# Patient Record
Sex: FEMALE | Race: WHITE | NOT HISPANIC OR LATINO | Employment: UNEMPLOYED | URBAN - METROPOLITAN AREA
[De-identification: names, ages, dates, MRNs, and addresses within clinical notes are randomized per-mention and may not be internally consistent; named-entity substitution may affect disease eponyms.]

---

## 2021-09-09 ENCOUNTER — TELEPHONE (OUTPATIENT)
Dept: SCHEDULING | Facility: IMAGING CENTER | Age: 66
End: 2021-09-09

## 2021-09-10 ENCOUNTER — SUPERVISING PHYSICIAN REVIEW (OUTPATIENT)
Dept: MEDICAL GROUP | Facility: MEDICAL CENTER | Age: 66
End: 2021-09-10

## 2021-09-10 ENCOUNTER — OFFICE VISIT (OUTPATIENT)
Dept: MEDICAL GROUP | Facility: MEDICAL CENTER | Age: 66
End: 2021-09-10

## 2021-09-10 VITALS
HEART RATE: 75 BPM | WEIGHT: 145.94 LBS | OXYGEN SATURATION: 95 % | HEIGHT: 67 IN | DIASTOLIC BLOOD PRESSURE: 88 MMHG | SYSTOLIC BLOOD PRESSURE: 138 MMHG | BODY MASS INDEX: 22.91 KG/M2 | TEMPERATURE: 98.9 F

## 2021-09-10 DIAGNOSIS — Z13.1 SCREENING FOR DIABETES MELLITUS: ICD-10-CM

## 2021-09-10 DIAGNOSIS — E78.5 HYPERLIPIDEMIA, UNSPECIFIED HYPERLIPIDEMIA TYPE: ICD-10-CM

## 2021-09-10 DIAGNOSIS — I10 ESSENTIAL HYPERTENSION: ICD-10-CM

## 2021-09-10 DIAGNOSIS — Z12.31 ENCOUNTER FOR SCREENING MAMMOGRAM FOR MALIGNANT NEOPLASM OF BREAST: ICD-10-CM

## 2021-09-10 PROCEDURE — 99214 OFFICE O/P EST MOD 30 MIN: CPT | Performed by: FAMILY MEDICINE

## 2021-09-10 RX ORDER — CARVEDILOL 3.12 MG/1
3.12 TABLET ORAL 2 TIMES DAILY WITH MEALS
Qty: 180 TABLET | Refills: 3 | Status: SHIPPED | OUTPATIENT
Start: 2021-09-10

## 2021-09-10 RX ORDER — LOSARTAN POTASSIUM AND HYDROCHLOROTHIAZIDE 25; 100 MG/1; MG/1
1 TABLET ORAL DAILY
COMMUNITY
End: 2021-09-10 | Stop reason: SDUPTHER

## 2021-09-10 RX ORDER — CHLORTHALIDONE 25 MG/1
25 TABLET ORAL DAILY
COMMUNITY
End: 2021-09-10 | Stop reason: SDUPTHER

## 2021-09-10 RX ORDER — LOSARTAN POTASSIUM 100 MG/1
100 TABLET ORAL DAILY
Qty: 90 TABLET | Refills: 3 | Status: SHIPPED | OUTPATIENT
Start: 2021-09-10

## 2021-09-10 RX ORDER — CHLORTHALIDONE 25 MG/1
25 TABLET ORAL DAILY
Qty: 90 TABLET | Refills: 3 | Status: SHIPPED | OUTPATIENT
Start: 2021-09-10

## 2021-09-10 RX ORDER — LOSARTAN POTASSIUM AND HYDROCHLOROTHIAZIDE 25; 100 MG/1; MG/1
1 TABLET ORAL DAILY
Qty: 90 TABLET | Refills: 3 | Status: SHIPPED
Start: 2021-09-10 | End: 2021-09-10

## 2021-09-10 ASSESSMENT — ENCOUNTER SYMPTOMS
DIARRHEA: 0
MYALGIAS: 0
DEPRESSION: 0
DOUBLE VISION: 0
WEIGHT LOSS: 0
BRUISES/BLEEDS EASILY: 0
TINGLING: 0
PALPITATIONS: 0
BLURRED VISION: 0
NERVOUS/ANXIOUS: 0
ABDOMINAL PAIN: 0
DIZZINESS: 0
VOMITING: 0
CONSTIPATION: 0
WEAKNESS: 0
SORE THROAT: 0
SHORTNESS OF BREATH: 0
CHILLS: 0
NAUSEA: 0
COUGH: 0
FEVER: 0

## 2021-09-10 ASSESSMENT — PATIENT HEALTH QUESTIONNAIRE - PHQ9: CLINICAL INTERPRETATION OF PHQ2 SCORE: 0

## 2021-09-10 NOTE — PROGRESS NOTES
Addendum to my previous note.  We clarified with patient she was not taking losartan hydrochlorothiazide combination, she was only taking losartan and chlorthalidone.  Plan to continue losartan, chlorthalidone and carvedilol.

## 2021-09-10 NOTE — ASSESSMENT & PLAN NOTE
Patient reports that she is currently taking chlorthalidone 25 mg daily as well as losartan 100 mg daily to help control her blood pressure.  She was attempting to refill her prescription when she noticed that her doctor's office had shut down and she was unable to get a hold of her previous primary care provider.  She needs a refill of her medications and does report blood pressures at home ranging anywhere from the 130s to 140s/80s to 90s.  Patient denies any chest pain, shortness of breath, difficulty breathing or cough.

## 2021-09-10 NOTE — ASSESSMENT & PLAN NOTE
Patient reports that her last blood test was taken approximately 3 years ago and she was told that she had elevated cholesterol levels though her good cholesterol was high enough that it canceled on her back cholesterol.  Denies being started on any cholesterol-lowering medications, reports that she was told that she did not need to be started on it.

## 2021-09-10 NOTE — PROGRESS NOTES
Supervising Physician Review    Reviewed note dated: 9/10/2021    Recommendations:  We discussed about this case in office.  She was on chlorthalidone and hydrochlorothiazide.  She reported that previously she had side effects cough to one of the medication and had lower extremity swelling with another medication.  Cough likely due to ACE inhibitor and lower extremity swelling likely due to calcium channel blocker.  Recommended to start on beta-blocker and continue chlorthalidone and losartan.

## 2021-09-10 NOTE — PROGRESS NOTES
Keli Win is a pleasant 66 y.o. female here to establish care and refill of her blood pressure medication.    HPI:    Essential hypertension  Patient reports that she is currently taking chlorthalidone 25 mg daily as well as losartan 100 mg daily to help control her blood pressure.  She was attempting to refill her prescription when she noticed that her doctor's office had shut down and she was unable to get a hold of her previous primary care provider.  She needs a refill of her medications and does report blood pressures at home ranging anywhere from the 130s to 140s/80s to 90s.  Patient denies any chest pain, shortness of breath, difficulty breathing or cough.    Hyperlipidemia  Patient reports that her last blood test was taken approximately 3 years ago and she was told that she had elevated cholesterol levels though her good cholesterol was high enough that it canceled on her back cholesterol.  Denies being started on any cholesterol-lowering medications, reports that she was told that she did not need to be started on it.      Health Maintenance  Colon cancer screening: Patient is due for her colonoscopy or Cologuard. Unable to do due to financial hardship  Hepatitis C screening: Patient is due for hepatitis C screening, unable to do due to financial hardship.  Vaccinations: Patient is due for her tetanus, shingles, Covid but unable to do due to financial hardship  Mammogram: Patient is overdue by 2 years, unable to complete due to financial hardship.    Current medicines (including changes today)  Current Outpatient Medications   Medication Sig Dispense Refill   • chlorthalidone (HYGROTON) 25 MG Tab Take 1 Tablet by mouth every day. 90 Tablet 3   • losartan (COZAAR) 100 MG Tab Take 1 Tablet by mouth every day. 90 Tablet 3   • carvedilol (COREG) 3.125 MG Tab Take 1 Tablet by mouth 2 times a day with meals. 180 Tablet 3     No current facility-administered medications for this visit.       Past Medical/  Surgical History  She  has a past medical history of Hypertension.  She  has a past surgical history that includes abdominal hysterectomy total.    Social History  Social History     Tobacco Use   • Smoking status: Former Smoker     Packs/day: 0.50     Years: 34.00     Pack years: 17.00     Types: Cigarettes     Quit date:      Years since quittin.6   • Smokeless tobacco: Never Used   Vaping Use   • Vaping Use: Never used   Substance Use Topics   • Alcohol use: Yes     Comment: only when she goes out   • Drug use: Not Currently     Social History     Social History Narrative   • Not on file        Family History  Family History   Problem Relation Age of Onset   • Stroke Mother    • Hypertension Mother    • Cancer Father         lung CA, + smk   • Depression Sister         SI   • Heart Disease Brother    • Hypertension Brother    • Other Brother         exposure to angent orange   • No Known Problems Son    • No Known Problems Son      Family Status   Relation Name Status   • Mo adopted    • Fa     • Sis     • Bro     • Son  Alive   • Son  Alive         Review of Systems   Constitutional: Negative for chills, fever, malaise/fatigue and weight loss.   HENT: Negative for hearing loss and sore throat.    Eyes: Negative for blurred vision and double vision.   Respiratory: Negative for cough and shortness of breath.    Cardiovascular: Negative for chest pain, palpitations and leg swelling.   Gastrointestinal: Negative for abdominal pain, constipation, diarrhea, nausea and vomiting.   Musculoskeletal: Negative for myalgias.   Skin: Negative for rash.   Neurological: Negative for dizziness, tingling and weakness.   Endo/Heme/Allergies: Does not bruise/bleed easily.   Psychiatric/Behavioral: Negative for depression. The patient is not nervous/anxious.          Objective:     /88 (BP Location: Left arm, Patient Position: Sitting, BP Cuff Size: Adult)   Pulse 75   Temp 37.2 °C  "(98.9 °F) (Temporal)   Ht 1.702 m (5' 7\")   Wt 66.2 kg (145 lb 15.1 oz)   SpO2 95%  Body mass index is 22.86 kg/m².    Physical Exam  Constitutional:       General: She is not in acute distress.  HENT:      Head: Normocephalic and atraumatic.      Right Ear: External ear normal.      Left Ear: External ear normal.   Eyes:      Conjunctiva/sclera: Conjunctivae normal.      Pupils: Pupils are equal, round, and reactive to light.   Cardiovascular:      Rate and Rhythm: Normal rate and regular rhythm.      Heart sounds: No murmur heard.   No friction rub. No gallop.    Pulmonary:      Effort: Pulmonary effort is normal.      Breath sounds: Normal breath sounds. No wheezing or rales.   Abdominal:      General: Bowel sounds are normal.      Palpations: Abdomen is soft.      Tenderness: There is no abdominal tenderness.   Musculoskeletal:      Cervical back: Normal range of motion and neck supple.      Right lower leg: No edema.      Left lower leg: No edema.   Skin:     General: Skin is warm and dry.      Findings: No rash.   Neurological:      Mental Status: She is alert and oriented to person, place, and time.      Gait: Gait is intact.   Psychiatric:         Mood and Affect: Affect normal.        Imaging:  No recent imaging to review    Labs:  No recent labs to review  Assessment and Plan:   The following treatment plan was discussed    1. Essential hypertension  Chronic, stable. Discussed with the patient about potentially initiating a third blood pressure medication due to borderline blood pressure. Due to patient's previous adverse reaction to the amlodipine we will go ahead and initiate Coreg 3.125 mg to help improve her overall blood pressure. We also discussed increasing physical activity and improvement in her overall diet which may help improve her blood pressure.  - chlorthalidone (HYGROTON) 25 MG Tab; Take 1 Tablet by mouth every day.  Dispense: 90 Tablet; Refill: 3  - losartan (COZAAR) 100 MG Tab; Take 1 " Tablet by mouth every day.  Dispense: 90 Tablet; Refill: 3  - carvedilol (COREG) 3.125 MG Tab; Take 1 Tablet by mouth 2 times a day with meals.  Dispense: 180 Tablet; Refill: 3    2. Hyperlipidemia, unspecified hyperlipidemia type  Chronic, stable. I discussed with the patient ways to help lower her overall cholesterol levels by choosing lean meats and fish, increasing fruits and vegetables and limited amounts of red meats and saturated fats.  - Lipid Profile; Future    3. Encounter for screening mammogram for malignant neoplasm of breast  Patient is overdue for her mammogram screening we will go ahead and place an order for her. I informed the patient that it the order is good for 1 year she should consider completing the screening.  - MA-SCREENING MAMMO BILAT W/TOMOSYNTHESIS W/CAD; Future    4. Screening for diabetes mellitus  - Comp Metabolic Panel; Future  - ESTIMATED GFR; Future      Records requested.  Followup: Return in about 1 year (around 9/10/2022), or if symptoms worsen or fail to improve, for annual.    Please note that this dictation was created using voice recognition software. I have made every reasonable attempt to correct obvious errors, but I expect that there are errors of grammar and possibly content that I did not discover before finalizing the note.

## 2021-09-10 NOTE — Clinical Note
I double checked and confirmed with the patient's, she is not taking the losartan hydrochlorothiazide combo. She is only taking losartan and the chlorthalidone. Her blood pressure still remains borderline so I continue to recommend the Coreg 3.125 mg.

## 2022-09-12 ENCOUNTER — APPOINTMENT (OUTPATIENT)
Dept: MEDICAL GROUP | Facility: MEDICAL CENTER | Age: 67
End: 2022-09-12